# Patient Record
(demographics unavailable — no encounter records)

---

## 2024-12-10 NOTE — ASSESSMENT
[FreeTextEntry1] : This is a 57 y/o male with primary hypothyroidism, Hashimoto's thyroiditis, pre-diabetes, HLD, here for a follow-up.  He takes levothyroxine 250 mcg daily (200 mcg tablet + 50 mcg tablet) the morning on the empty stomach.   Lifestyle modification for prediabetes Will check fasting lipid panel for hyperlipidemia. Check thyroid ultrasound to evaluate for thyroid nodules.

## 2024-12-10 NOTE — HISTORY OF PRESENT ILLNESS
[FreeTextEntry1] : CC: Hypothyroidism  This is a 59 y/o male with primary hypothyroidism, Hashimoto's thyroiditis, pre-diabetes, hyperlipidemia, here for a follow-up.  Hypothyroidism diagnosed at the age of 40.  He takes levothyroxine 250 mcg daily (200 mcg tablet + 50 mcg tablet) the morning on the empty stomach.  His mother has a history of thyroid nodules.

## 2024-12-10 NOTE — ADDENDUM
[FreeTextEntry1] :  By signing my name below, I, Hunter Valencia, attest that this document has been prepared under the direction and in the presence of Dr. Reed.  I, Mukul Reed MD, personally performed the services described in this documentation. All medical record entries made by the scribe were at my discretion and in my presence. I have reviewed the chart and discharge instructions (if applicable) and agree that the record reflects my personal permanence and is accurate and complete.

## 2024-12-10 NOTE — PHYSICAL EXAM
[Alert] : alert [Well Nourished] : well nourished [Healthy Appearance] : healthy appearance [No Acute Distress] : no acute distress [Well Developed] : well developed [Normal Voice/Communication] : normal voice communication [Normal Sclera/Conjunctiva] : normal sclera/conjunctiva [No Proptosis] : no proptosis [No Neck Mass] : no neck mass was observed [No LAD] : no lymphadenopathy [Thyroid Not Enlarged] : the thyroid was not enlarged [No Thyroid Nodules] : no palpable thyroid nodules [No Respiratory Distress] : no respiratory distress [Normal Affect] : the affect was normal [Normal Insight/Judgement] : insight and judgment were intact [Normal Mood] : the mood was normal

## 2025-05-28 NOTE — HISTORY OF PRESENT ILLNESS
[FreeTextEntry1] : CC: Hypothyroidism  This is a 59 y/o male with primary hypothyroidism, Hashimoto's thyroiditis, obesity, pre-diabetes, hyperlipidemia, JANENE on CPAP, here for a follow-up.  Hypothyroidism diagnosed at the age of 40.  He takes levothyroxine 250 mcg daily (200 mcg tablet + 50 mcg tablet) the morning on the empty stomach.  Thyroid ultrasound from May 16th, 2025 showed heterogenous appearance of the thyroid gland, which can be based on thyroiditis. Reports weight gain.

## 2025-05-28 NOTE — ASSESSMENT
[FreeTextEntry1] : This is a 57 y/o male with primary hypothyroidism, Hashimoto's thyroiditis, obesity, pre-diabetes, hyperlipidemia, JANENE on CPAP, here for a follow-up. 1. Primary Hypothyroidism He takes levothyroxine 250 mcg daily (200 mcg tablet + 50 mcg tablet) the morning on the empty stomach. Check TFTs Thyroid ultrasound from May 16th, 2025 showed heterogenous appearance of the thyroid gland, which can be based on thyroiditis.  No further thyroid ultrasound needed. 2. Obesity/pre-DM Check hba1c LSM He is interested in GLP1 receptor agonist. GI side effects reviewed, no history of pancreatitis, no personal or family history of thyroid cancer. Will check for coverage.

## 2025-05-28 NOTE — HISTORY OF PRESENT ILLNESS
[FreeTextEntry1] : CC: Hypothyroidism  This is a 57 y/o male with primary hypothyroidism, Hashimoto's thyroiditis, obesity, pre-diabetes, hyperlipidemia, JANENE on CPAP, here for a follow-up.  Hypothyroidism diagnosed at the age of 40.  He takes levothyroxine 250 mcg daily (200 mcg tablet + 50 mcg tablet) the morning on the empty stomach.  Thyroid ultrasound from May 16th, 2025 showed heterogenous appearance of the thyroid gland, which can be based on thyroiditis. Reports weight gain.

## 2025-05-28 NOTE — ASSESSMENT
[FreeTextEntry1] : This is a 59 y/o male with primary hypothyroidism, Hashimoto's thyroiditis, obesity, pre-diabetes, hyperlipidemia, JANENE on CPAP, here for a follow-up. 1. Primary Hypothyroidism He takes levothyroxine 250 mcg daily (200 mcg tablet + 50 mcg tablet) the morning on the empty stomach. Check TFTs Thyroid ultrasound from May 16th, 2025 showed heterogenous appearance of the thyroid gland, which can be based on thyroiditis.  No further thyroid ultrasound needed. 2. Obesity/pre-DM Check hba1c LSM He is interested in GLP1 receptor agonist. GI side effects reviewed, no history of pancreatitis, no personal or family history of thyroid cancer. Will check for coverage.